# Patient Record
(demographics unavailable — no encounter records)

---

## 2025-07-21 NOTE — DISCUSSION/SUMMARY
[FreeTextEntry1] : 1.  Atherosclerotic plaque: Reviewed abdominal ultrasound which showed possible abdominal aortic plaque.  Will obtain a fasting lipid panel lipoprotein a and advise as results are known.  Patient did report after starting medication for GERD chest discomfort improved. [EKG obtained to assist in diagnosis and management of assessed problem(s)] : EKG obtained to assist in diagnosis and management of assessed problem(s)

## 2025-07-21 NOTE — PHYSICAL EXAM

## 2025-07-21 NOTE — HISTORY OF PRESENT ILLNESS
[FreeTextEntry1] : 44-year female with no significant past medical history comes in for follow-up.  Patient with H. pylori went to GI underwent an ultrasound and found to have atherosclerosis in the proximal aorta.  Did undergo a coronary calcium score in 2023 which was 0.  Overall she feels well denies chest pain shortness of breath PND orthopnea.